# Patient Record
Sex: MALE | Race: WHITE
[De-identification: names, ages, dates, MRNs, and addresses within clinical notes are randomized per-mention and may not be internally consistent; named-entity substitution may affect disease eponyms.]

---

## 2017-11-09 ENCOUNTER — HOSPITAL ENCOUNTER (OUTPATIENT)
Dept: HOSPITAL 59 - SUR | Age: 77
Discharge: HOME | End: 2017-11-09
Attending: ORTHOPAEDIC SURGERY
Payer: MEDICARE

## 2017-11-09 DIAGNOSIS — M11.261: ICD-10-CM

## 2017-11-09 DIAGNOSIS — S83.281A: Primary | ICD-10-CM

## 2017-11-09 DIAGNOSIS — E11.9: ICD-10-CM

## 2017-11-09 DIAGNOSIS — Z79.84: ICD-10-CM

## 2017-11-09 PROCEDURE — 01400 ANES OPN/ARTHRS KNEE JT NOS: CPT

## 2017-11-09 PROCEDURE — 36416 COLLJ CAPILLARY BLOOD SPEC: CPT

## 2017-11-09 PROCEDURE — 29881 ARTHRS KNE SRG MNISECTMY M/L: CPT

## 2017-11-09 PROCEDURE — 82948 REAGENT STRIP/BLOOD GLUCOSE: CPT

## 2017-11-09 PROCEDURE — 29876 ARTHRS KNEE SURG SYNVCT MAJ: CPT

## 2017-11-10 NOTE — OPERATIVE NOTE
DATE OF SURGERY: 11/09/2017 



Surgeon: Aram Marie DO



PREOPERATIVE DIAGNOSES: 

1. Torn medial meniscus of the right knee. 

2. Chondrocalcinosis, right knee. 

3. Osteoarthritis, right knee. 



POSTOPERATIVE DIAGNOSES: 

1. Torn lateral meniscus of the right knee. 

2. Osteoarthritis, right knee. 

3. Synovitis, right knee. 

4. Chondrocalcinosis of the right knee. 



OPERATION: 

1. Arthroscopic partial lateral meniscectomy, right knee. 

2. Arthroscopic partial synovectomy, right knee (2 compartments). 



DESCRIPTION OF PROCEDURE: This 77-year-old male was taken to the operating room, placed in the 
supine position on the operating room table. General anesthesia was induced. The right lower 
extremity was elevated. It was exsanguinated and the tourniquet inflated to 300 mmHg. Arthroscopic 
knee schmidt applied. Right knee prepped with Hibiclens and draped in the usual sterile fashion. 



An inferolateral portion was established for the 4 mm arthroscope. Initial evaluation of the joint 
demonstrated very severe osteoarthritis of the patellofemoral joint with the lateral facet and 
lateral femoral condyle showing full-thickness articular cartilage loss and the remaining articular 
cartilage showing grade 3 changes. Severe chondrocalcinosis was also present. The intensive 
synovitis was present and partial synovectomy was performed. 



The medial compartment was entered and again, more synovitis with chondrocalcinosis was noted. 
Minimal degenerative changes to the articular cartilage were present there. The meniscus did not 
demonstrate any significant tearing. 



The intracondylar notch was seen to be normal. There was chondrocalcinosis there as well. 



The lateral compartment was entered and a flap tear of the anterior horn and then a radial tear of 
the body at approximately the 9:30 position was present. We resected back to the apex of the tear 
anteriorly leaving only about 2-3 mm of rim remaining. Posteriorly it was slightly more, and this 
tear did not enter the popliteal hiatus. Extensive chondrocalcinosis was present there as well. The 
meniscus was then re-probed and found to be stable. The articular cartilage of the weightbearing 
surface of the lateral femoral condyle demonstrated grade 2 changes. The joint was copiously 
irrigated and suctioned. The portals were infiltrated with 0.25% Marcaine with epinephrine. Sutures 
were placed in these wounds, 4-0 nylon suture. The sterile dressings were applied and the patient 
was taken to the recovery room in satisfactory condition. 



GROSS PATHOLOGY: There was extensive chondrocalcinosis with a severe grade 4 lesion at the 
patellofemoral articulation laterally with severe grade 3 changes noted throughout the remainder of 
the patellofemoral joint, grade 2 changes noted in the weightbearing surface of the lateral femoral 
condyle. There was a complex tear of the lateral meniscus as described and intense synovitis in all 
compartments and partial synovectomy performed as well. CC: YELENA JOINER MD, FACP

MTDD

## 2018-01-01 ENCOUNTER — HOSPITAL ENCOUNTER (EMERGENCY)
Dept: HOSPITAL 59 - ER | Age: 78
Discharge: LEFT BEFORE DISCHARGE | End: 2018-01-01
Payer: MEDICARE

## 2018-01-01 DIAGNOSIS — E11.9: ICD-10-CM

## 2018-01-01 DIAGNOSIS — R07.2: Primary | ICD-10-CM

## 2018-01-01 DIAGNOSIS — L03.115: ICD-10-CM

## 2018-01-01 DIAGNOSIS — Z79.84: ICD-10-CM

## 2018-01-01 LAB
ALBUMIN SERPL-MCNC: 3.9 G/DL (ref 4–5)
ALBUMIN/GLOB SERPL: 1.3 {RATIO} (ref 1.1–1.8)
ALP SERPL-CCNC: 72 U/L (ref 40–129)
ALT SERPL-CCNC: 26 U/L (ref ?–41)
ANION GAP SERPL CALC-SCNC: 13 MMOL/L (ref 7–16)
APTT BLD: 26.6 SECONDS (ref 24.5–39.1)
AST SERPL-CCNC: 22 U/L (ref 10–50)
BASOPHILS NFR BLD: 0.4 % (ref 0–6)
BILIRUB SERPL-MCNC: 0.3 MG/DL (ref 0.2–1)
BUN SERPL-MCNC: 14 MG/DL (ref 8–23)
CO2 SERPL-SCNC: 27 MMOL/L (ref 22–29)
CREAT SERPL-MCNC: 1 MG/DL (ref 0.7–1.2)
EOSINOPHIL NFR BLD: 7.2 % (ref 0–6)
ERYTHROCYTE [DISTWIDTH] IN BLOOD BY AUTOMATED COUNT: 19.7 % (ref 11.5–14.5)
EST GLOMERULAR FILTRATION RATE: > 60 ML/MIN
GLOBULIN SER-MCNC: 3 GM/DL (ref 1.4–4.8)
GLUCOSE SERPL-MCNC: 287 MG/DL (ref 74–109)
GRANULOCYTES NFR BLD: 51.3 % (ref 47–80)
HCT VFR BLD CALC: 40.8 % (ref 42–52)
HGB BLD-MCNC: 12.5 GM/DL (ref 14–18)
INR PPP: 1.01
LYMPHOCYTES NFR BLD AUTO: 28.3 % (ref 16–45)
MCH RBC QN AUTO: 26.9 PG (ref 27–33)
MCHC RBC AUTO-ENTMCNC: 30.6 G/DL (ref 32–36)
MCV RBC AUTO: 88.1 FL (ref 81–97)
MONOCYTES NFR BLD: 12.8 % (ref 0–9)
PLATELET # BLD: 273 K/UL (ref 130–400)
PMV BLD AUTO: 9.9 FL (ref 7.4–10.4)
PROT SERPL-MCNC: 6.9 G/DL (ref 6.6–8.7)
PROTHROMBIN TIME: 10.9 SECONDS (ref 9.5–12.1)
RBC # BLD AUTO: 4.63 M/UL (ref 4.4–5.7)
TSH SERPL-ACNC: 4.64 UIU/ML (ref 0.27–4.2)
WBC # BLD AUTO: 7.1 K/UL (ref 4.2–12.2)

## 2018-01-01 PROCEDURE — 93010 ELECTROCARDIOGRAM REPORT: CPT

## 2018-01-01 PROCEDURE — 71020: CPT

## 2018-01-01 PROCEDURE — 99284 EMERGENCY DEPT VISIT MOD MDM: CPT

## 2018-01-01 PROCEDURE — 83880 ASSAY OF NATRIURETIC PEPTIDE: CPT

## 2018-01-01 PROCEDURE — 93005 ELECTROCARDIOGRAM TRACING: CPT

## 2018-01-01 PROCEDURE — 96374 THER/PROPH/DIAG INJ IV PUSH: CPT

## 2018-01-01 PROCEDURE — 85025 COMPLETE CBC W/AUTO DIFF WBC: CPT

## 2018-01-01 PROCEDURE — 84484 ASSAY OF TROPONIN QUANT: CPT

## 2018-01-01 PROCEDURE — 80053 COMPREHEN METABOLIC PANEL: CPT

## 2018-01-01 PROCEDURE — 84443 ASSAY THYROID STIM HORMONE: CPT

## 2018-01-01 PROCEDURE — 85610 PROTHROMBIN TIME: CPT

## 2018-01-01 PROCEDURE — 85730 THROMBOPLASTIN TIME PARTIAL: CPT

## 2018-01-01 RX ADMIN — NITROGLYCERIN PRN MG: 0.4 TABLET SUBLINGUAL at 19:47

## 2018-01-01 RX ADMIN — NITROGLYCERIN PRN MG: 0.4 TABLET SUBLINGUAL at 19:30

## 2018-01-01 RX ADMIN — NITROGLYCERIN PRN MG: 0.4 TABLET SUBLINGUAL at 19:53

## 2018-01-01 NOTE — EMERGENCY DEPARTMENT RECORD
History of Present Illness





- General


Chief Complaint: Chest Pain


Stated Complaint: CHEST AND SHOULDER PAIN


Time Seen by Provider: 18 19:11


Source: Patient


Mode of Arrival: Ambulatory





- History of Present Illness


Initial Comments: 


The patient states that he developed substernal chest pain around 5 p.m. 

tonight while watching FB on television. It radiates to both shoulders but NOT 

to his back. Patient denies nausea, diaphoresis, SOB, ARI, palpitations or 

belching.  He describes it as moderate severity and "maybe gas, 'hard to 

describe.'"  He denies MI, PE, DVT, CVA, htn, chol elevation, or ever smoking.  

He is an oral diabetic.  His father  of an MI at age 75.  


   Dr. Marie did an arthroscopy on his right knee 17 and found 'gout

, arthritis." Since that time he has had swelling in that leg which is not 

resolving. He says it is warmer and may be pinker.





MD Complaint: Chest pain


Onset/Timin


-: Hour(s)


Onset: During rest


Pain Location: Substernal


Pain Radiation: RUE, LUE


Severity: Moderate


Quality: Aching


Consistency: Constant


Improves With: Nothing


Treatments Prior to Arrival: None, Aspirin


Treatment Prior to Arrival Comment:: 81mg this am.





- Related Data


 Previous Rx's











 Medication  Instructions  Recorded


 


Cephalexin [Keflex] 500 mg PO QID #40 cap 18











 Allergies











Allergy/AdvReac Type Severity Reaction Status Date / Time


 


No Known Drug Allergies Allergy   Verified 18 18:52














Travel Screening





- Travel/Exposure Within Last 30 Days


Have you traveled within the last 30 days?: No





- Travel/Exposure Within Last Year


Have you traveled outside the [place] in the last year?: No





- Additonal Travel Details


Have you been exposed to anyone with a communicable illness?: No





- Travel Symptoms


Symptom Screening: None





Review of Systems


Reviewed: No additional complaints except as noted below


Constitutional: Reports: As per HPI.  Denies: Chills, Fever, Malaise, Night 

sweats, Weakness, Weight change


Eyes: Reports: As per HPI.  Denies: Eye discharge, Eye pain, Photophobia, 

Vision change


ENT: Reports: As per HPI.  Denies: Congestion, Dental pain, Ear pain, Epistaxis

, Hearing loss, Throat pain


Respiratory: Reports: As per HPI.  Denies: Cough, Dyspnea, Hemoptysis, Stridor, 

Wheezes


Cardiovascular: Reports: As per HPI.  Denies: Arrhythmia, Chest pain, Dyspnea 

on exertion, Edema, Murmurs, Orthopnea, Palpitations, Paroxysmal nocturnal 

dyspnea, Rheumatic Fever, Syncope


Endocrine: Reports: As per HPI.  Denies: Fatigue, Heat or cold intolerance, 

Polydipsia, Polyuria


Gastrointestinal: Reports: As per HPI.  Denies: Abdominal pain, Constipation, 

Diarrhea, Hematemesis, Hematochezia, Melena, Nausea, Vomiting


Genitourinary: Reports: As per HPI.  Denies: Dysuria, Frequency, Hematuria, 

Incontinence, Retention, Testicular pain, Testicular mass, Urgency


Musculoskeletal: Reports: As per HPI.  Denies: Arthralgia, Back pain, Gout, 

Joint swelling, Myalgia, Neck pain


Skin: Reports: As per HPI.  Denies: Bruising, Change in color, Change in hair/

nails, Lesions, Pruritus, Rash


Neurological: Reports: As per HPI.  Denies: Abnormal gait, Confusion, Headache, 

Numbness, Paresthesias, Seizure, Tingling, Tremors, Vertigo, Weakness


Psychiatric: Reports: As per HPI.  Denies: Anxiety, Auditory hallucinations, 

Depression, Homicidal thoughts, Suicidal thoughts, Visual hallucinations


Hematological/Lymphatic: Reports: As per HPI.  Denies: Anemia, Blood Clots, 

Easy bleeding, Easy bruising, Swollen glands





Past Medical History





- SOCIAL HISTORY


Smoking Status: Never smoker


Alcohol Use: None


Drug Use: None





- RESPIRATORY


Hx Respiratory Disorders: Yes


Hx Bronchitis: Yes





- CARDIOVASCULAR


Hx Cardio Disorders: No





- NEURO


Hx Neuro Disorders: No





- GI


Hx GI Disorders: Yes


Hx Pancreatitis: Yes





- 


Hx Genitourinary Disorders: No





- ENDOCRINE


Hx Endocrine Disorders: Yes


Hx Diabetes: Yes (type II)


Comment:: doesnt check blood sugar





- MUSCULOSKELETAL


Hx Musculoskeletal Disorders: Yes


Hx Arthritis: Yes (Left knee)





- PSYCH


Hx Psych Problems: No





- HEMATOLOGY/ONCOLOGY


Hx Hematology/Oncology Disorders: No


Hx Cancer: Yes (lip)





Family Medical History


Any Significant Family History?: No


Hx Cancer: Mother


Hx Diabetes: Father


Hx Heart Disease: Father





Physical Exam





- General


General Appearance: Alert, Oriented x3, Cooperative, No acute distress





- Head


Head exam: Normal inspection





- Eye


Eye exam: Normal appearance, PERRL


Pupils: Normal accommodation





- ENT


ENT exam: Normal exam, Mucous membranes moist, Normal external ear exam, Normal 

orophraynx, TM's normal bilaterally


Ear exam: Normal external inspection.  negative: External canal tenderness


Nasal Exam: Normal inspection.  negative: Discharge, Sinus tenderness


Mouth exam: Normal external inspection, Tongue normal


Teeth exam: Normal inspection.  negative: Dental caries


Throat exam: Normal inspection.  negative: Tonsillar erythema, Tonsillar exudate





- Neck


Neck exam: Normal inspection, Full ROM.  negative: Tenderness





- Respiratory


Respiratory exam: Normal lung sounds bilaterally.  negative: Respiratory 

distress





- Cardiovascular


Cardiovascular Exam: Regular rate, Normal rhythm, Normal heart sounds





- GI/Abdominal


GI/Abdominal exam: Soft, Normal bowel sounds.  negative: Tenderness





- Rectal


Rectal exam: Deferred





- 


 exam: Deferred





- Extremities


Extremities exam: Normal inspection, Full ROM, Normal capillary refill, Pedal 

edema (bilateral, right greater than left leg.  Right legs is mildly 

erythematous and warmer than the othe leg to below the knee. Skin is 

circumferentially tight from the swelling with no distinct calf tenderness.).  

negative: Tenderness





- Back


Back exam: Reports: Normal inspection, Full ROM.  Denies: Muscle spasm, Rash 

noted, Tenderness





- Neurological


Neurological exam: Alert, Normal gait, Oriented X3, Reflexes normal





- Psychiatric


Psychiatric exam: Normal affect, Normal mood





- Skin


Skin exam: Dry, Intact, Normal color, Warm





Course





 Vital Signs











  18





  18:47


 


Temperature 97.4 F L


 


Pulse Rate 72


 


Respiratory 20





Rate 


 


Blood Pressure 161/82


 


Pulse Ox 96














- Reevaluation(s)


Reevaluation #1: 


Patient stats the nitros helped his chest, but he is uncertain if it is 

completely gone. He states his leg hurts more than his chest ever did. 


18 20:27





Reevaluation #2: 


It ws recommended for patient to be admitted for chest pain and for right lower 

leg cellulitis. Patient is refusing amissin and wishes discharge home. He 

states he will recheck with his PCP this week. but refuses to stay here 

tonight. He also is refusing to stay for repeat troponin.  Son at bedside is 

unable to change his father's mind. 


18 21:21


Patient is adamant about leaving AMA. He agrees to come back if he changes his 

mind and to see Dr. Meyers in office this week without fail.





18 21:24





18 21:33








Medical Decision Making





- Management Options


MDM Management: Additional Work-up Planned (e.g. ADM/Transfer/OP Study) (

Refusing admission LEAVING AMA)





- Data Complexity


MDM Data: Labs Ordered and/or Reviewed, X-Ray Ordered and/or Reviewed (CXR 

Negative per rad.), EKG Ordered and/or Reviewed





- Lab Data


Result diagrams: 


 18 18:58





 18 18:58





- EKG Data


-: EKG Interpreted by Me


EKG: LBBB (old and unchanged from 17.)





Disposition


Clinical Impression: 


 Cellulitis of leg, right





Chest pain


Qualifiers:


 Chest pain type: unspecified Qualified Code(s): R07.9 - Chest pain, unspecified





Disposition: Against Medical Advice


Condition: (1) Good


Instructions:  Chest Pain (ED), Cellulitis (ED)


Additional Instructions: 


Call Dr. Meyers office in a.m to be seen without fail.


Return if you change your mind.


Keflex 500 mg four times daily as directed.








Prescriptions: 


Cephalexin [Keflex] 500 mg PO QID #40 cap


Forms:  Patient Portal Access





Quality





- Quality Measures


Quality Measures: N/A





- Blood Pressure Screening


Does Patient Have Any of the Following: No


Blood Pressure Classification: Pre-Hypertensive BP Reading


Systolic Measurement: 161


Diastolic Measurement: 82


Screening for High Blood Pressure: < Pre-Hypertensive BP, F/U Documented > [

]


Pre-Hypertensive Follow-up Interventions: Follow-up with rescreen every year.

## 2018-01-02 NOTE — RADIOLOGY REPORT
EXAM:  CHEST, TWO VIEWS



HISTORY:  CHEST PAIN RADIATING TO BOTH SHOULDERS.  



TECHNIQUE:  Two views of the chest were obtained.  



Comparison:  5/16/15.  



FINDINGS:  The heart is not enlarged.  The lungs and pleural spaces are clear.  
Mild to moderate degenerative changes of the thoracic spine.  



IMPRESSION:  

NO ACUTE CARDIOPULMONARY ABNORMALITY.  



JOB NUMBER:  [id number]
MTDD

## 2018-01-29 ENCOUNTER — HOSPITAL ENCOUNTER (OUTPATIENT)
Dept: HOSPITAL 59 - HOP | Age: 78
Discharge: HOME | End: 2018-01-29
Attending: INTERNAL MEDICINE
Payer: MEDICARE

## 2018-01-29 DIAGNOSIS — K64.8: ICD-10-CM

## 2018-01-29 DIAGNOSIS — C18.2: ICD-10-CM

## 2018-01-29 DIAGNOSIS — K57.30: ICD-10-CM

## 2018-01-29 DIAGNOSIS — E11.9: ICD-10-CM

## 2018-01-29 DIAGNOSIS — D50.9: Primary | ICD-10-CM

## 2018-01-29 DIAGNOSIS — Z79.4: ICD-10-CM

## 2018-11-10 ENCOUNTER — HOSPITAL ENCOUNTER (EMERGENCY)
Dept: HOSPITAL 59 - ER | Age: 78
Discharge: HOME | End: 2018-11-10
Payer: MEDICARE

## 2018-11-10 DIAGNOSIS — S30.0XXA: Primary | ICD-10-CM
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
## 2018-11-10 DIAGNOSIS — E11.9: ICD-10-CM

## 2018-11-10 DIAGNOSIS — Z79.84: ICD-10-CM
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
## 2018-11-10 PROCEDURE — 99283 EMERGENCY DEPT VISIT LOW MDM: CPT

## 2018-11-10 PROCEDURE — 72110 X-RAY EXAM L-2 SPINE 4/>VWS: CPT

## 2018-11-10 NOTE — EMERGENCY DEPARTMENT RECORD
History of Present Illness





- General


Chief Complaint: Fall Injury


Stated Complaint: FALL/LOWER BACK INJURY


Time Seen by Provider: 11/10/18 14:31


Source: Patient, RN notes reviewed


Mode of Arrival: Ambulatory





- History of Present Illness


Initial Comments: 


fall on the ice using a bull dozer complaining of lumbar pback pain L5 





MD Complaint: Fall


Onset/Timin


-: Days(s)


Fall From: Standing


Fall Witnessed: Yes, by family


Place Fall Occurred: Work


Loss of Consciousness: None


Prolonged Down Time?: No


Symptoms Prior to Fall: None





- Brennan Coma Scale


Eye Response: (4) Open spontaneously


Motor Response: (6) Obeys commands


Verbal Response: (5) Oriented


Ragland Total: 15





- Related Data


 Previous Rx's











 Medication  Instructions  Recorded


 


Hydrocodone/Acetaminophen [Norco 1 each PO Q4HR #18 tablet 11/10/18





5-325 Tablet]  











 Allergies











Allergy/AdvReac Type Severity Reaction Status Date / Time


 


No Known Drug Allergies Allergy   Verified 11/10/18 14:32














Travel Screening





- Travel/Exposure Within Last 30 Days


Have you traveled within the last 30 days?: No





- Travel/Exposure Within Last Year


Have you traveled outside the [place] in the last year?: No





- Additonal Travel Details


Have you been exposed to anyone with a communicable illness?: No





- Travel Symptoms


Symptom Screening: None





Review of Systems


Reviewed: No additional complaints except as noted below


Constitutional: Reports: As per HPI.  Denies: Chills, Fever, Malaise, Night 

sweats, Weakness, Weight change


Eyes: Reports: As per HPI.  Denies: Eye discharge, Eye pain, Photophobia, 

Vision change


ENT: Reports: As per HPI.  Denies: Congestion, Dental pain, Ear pain, Epistaxis

, Hearing loss, Throat pain


Respiratory: Reports: As per HPI.  Denies: Cough, Dyspnea, Hemoptysis, Stridor, 

Wheezes


Cardiovascular: Reports: As per HPI.  Denies: Arrhythmia, Chest pain, Dyspnea 

on exertion, Edema, Murmurs, Orthopnea, Palpitations, Paroxysmal nocturnal 

dyspnea, Rheumatic Fever, Syncope


Endocrine: Reports: As per HPI.  Denies: Fatigue, Heat or cold intolerance, 

Polydipsia, Polyuria


Gastrointestinal: Reports: As per HPI.  Denies: Abdominal pain, Constipation, 

Diarrhea, Hematemesis, Hematochezia, Melena, Nausea, Vomiting


Genitourinary: Reports: As per HPI.  Denies: Dysuria, Frequency, Hematuria, 

Incontinence, Retention, Testicular pain, Testicular mass, Urgency


Musculoskeletal: Reports: As per HPI, Back pain.  Denies: Arthralgia, Gout, 

Joint swelling, Myalgia, Neck pain


Skin: Reports: As per HPI.  Denies: Bruising, Change in color, Change in hair/

nails, Lesions, Pruritus, Rash


Neurological: Reports: As per HPI.  Denies: Abnormal gait, Confusion, Headache, 

Numbness, Paresthesias, Seizure, Tingling, Tremors, Vertigo, Weakness


Psychiatric: Reports: As per HPI.  Denies: Anxiety, Auditory hallucinations, 

Depression, Homicidal thoughts, Suicidal thoughts, Visual hallucinations


Hematological/Lymphatic: Reports: As per HPI.  Denies: Anemia, Blood Clots, 

Easy bleeding, Easy bruising, Swollen glands





Past Medical History





- SOCIAL HISTORY


Smoking Status: Never smoker


Alcohol Use: None


Drug Use: None





- RESPIRATORY


Hx Respiratory Disorders: Yes


Hx Bronchitis: Yes





- CARDIOVASCULAR


Hx Cardio Disorders: No





- NEURO


Hx Neuro Disorders: No





- GI


Hx GI Disorders: Yes


Hx Pancreatitis: Yes





- 


Hx Genitourinary Disorders: No





- ENDOCRINE


Hx Endocrine Disorders: Yes


Hx Diabetes: Yes (type II)


Comment:: doesnt check blood sugar





- MUSCULOSKELETAL


Hx Musculoskeletal Disorders: Yes


Hx Arthritis: Yes (Left knee)





- PSYCH


Hx Psych Problems: No





- HEMATOLOGY/ONCOLOGY


Hx Hematology/Oncology Disorders: No


Hx Cancer: Yes (lip)





Family Medical History


Any Significant Family History?: Yes


Hx Cancer: Mother


Hx Diabetes: Father


Hx Heart Disease: Father





Physical Exam





- General


General Appearance: Alert, Oriented x3, Cooperative, No acute distress





- Head


Head exam: Normal inspection





- Eye


Eye exam: Normal appearance, PERRL


Pupils: Normal accommodation





- ENT


ENT exam: Normal exam, Mucous membranes moist, Normal external ear exam, Normal 

orophraynx, TM's normal bilaterally


Ear exam: Normal external inspection.  negative: External canal tenderness


Nasal Exam: Normal inspection.  negative: Discharge, Sinus tenderness


Mouth exam: Normal external inspection, Tongue normal


Teeth exam: Normal inspection.  negative: Dental caries


Throat exam: Normal inspection.  negative: Tonsillar erythema, Tonsillar exudate





- Neck


Neck exam: Normal inspection, Full ROM.  negative: Tenderness





- Respiratory


Respiratory exam: Normal lung sounds bilaterally.  negative: Respiratory 

distress





- Cardiovascular


Cardiovascular Exam: Regular rate, Normal rhythm, Normal heart sounds





- GI/Abdominal


GI/Abdominal exam: Soft, Normal bowel sounds.  negative: Tenderness





- Rectal


Rectal exam: Deferred





- 


 exam: Deferred





- Extremities


Extremities exam: Normal inspection, Full ROM, Normal capillary refill.  

negative: Tenderness





- Back


Back exam: Reports: Normal inspection, Full ROM.  Denies: Muscle spasm, Rash 

noted, Tenderness





- Neurological


Neurological exam: Alert, Normal gait, Oriented X3, Reflexes normal





- Psychiatric


Psychiatric exam: Normal affect, Normal mood





- Skin


Skin exam: Dry, Intact, Normal color, Warm





Course





 Vital Signs











  11/10/18





  14:23


 


Temperature 98.3 F


 


Pulse Rate 94 H


 


Respiratory 18





Rate 


 


Blood Pressure 150/94


 


Pulse Ox 97














Medical Decision Making





- Data Complexity


MDM Data: X-Ray Ordered and/or Reviewed (no evidence of lumbar compression 

fracture)





Disposition


Clinical Impression: 


Lumbar contusion


Qualifiers:


 Encounter type: initial encounter Qualified Code(s): S30.0XXA - Contusion of 

lower back and pelvis, initial encounter





Disposition: Home, Self-Care


Condition: (1) Good


Instructions:  Contusion in Adults (ED)


Additional Instructions: 


follow up with Dr Meyers or Jeanne in 4-7 days


for mild to moderate pain use tylenol or ibuprofin


Prescriptions: 


Hydrocodone/Acetaminophen [Norco 5-325 Tablet] 1 each PO Q4HR #18 tablet


Forms:  Patient Portal Access


Time of Disposition: 15:37





Quality





- Quality Measures


Quality Measures: N/A





- Blood Pressure Screening


Does Patient Have Any of the Following: No


Blood Pressure Classification: Hypertensive Reading


Systolic Measurement: 150


Diastolic Measurement: 94


Screening for High Blood Pressure: < First Hypertensive BP, F/U Documented > [

]


First Hypertensive Follow-up Interventions: Referral to alternative/primary 

care provider.

## 2018-11-12 NOTE — RADIOLOGY REPORT
EXAM:  LUMBAR SPINE, FIVE VIEWS 



HISTORY:  FALL YESTERDAY LANDING ON TAILBONE, TAILBONE PAIN.  



TECHNIQUE:  AP, lateral, bilateral obliques, and cone down view of the lumbar 
spine were obtained.  



Comparison:  CT of the abdomen and pelvis 2/05/18. 



Encounter:  Initial. 



FINDINGS:  Five lumbar vertebral bodies.  No visible spondylolysis.  Alignment 
and vertebral body heights are maintained.  Moderate to severe disk space 
narrowing at L1-L2, L2-L3, L4-L5, and L5-S1 with multilevel end plate spurring 
and large syndesmophytes.  Facet arthropathy is greatest in the lower lumbar 
spine.  



IMPRESSION:  

1.  NO EVIDENCE FOR LUMBAR SPINE COMPRESSION FRACTURE.  



2.  MULTILEVEL DEGENERATIVE CHANGES.  



JOB NUMBER:  [id number]
[facility]

## 2019-01-17 ENCOUNTER — HOSPITAL ENCOUNTER (OUTPATIENT)
Dept: HOSPITAL 59 - SUR | Age: 79
LOS: 1 days | Discharge: HOME | End: 2019-01-18
Attending: ORTHOPAEDIC SURGERY
Payer: MEDICARE

## 2019-01-17 DIAGNOSIS — E11.9: ICD-10-CM

## 2019-01-17 DIAGNOSIS — M17.12: Primary | ICD-10-CM

## 2019-01-17 PROCEDURE — 76942 ECHO GUIDE FOR BIOPSY: CPT

## 2019-01-17 PROCEDURE — 64447 NJX AA&/STRD FEMORAL NRV IMG: CPT

## 2019-01-17 PROCEDURE — 27447 TOTAL KNEE ARTHROPLASTY: CPT

## 2019-01-17 PROCEDURE — 01402 ANES OPN/ARTH TOT KNE ARTHRP: CPT

## 2019-01-17 PROCEDURE — 97530 THERAPEUTIC ACTIVITIES: CPT

## 2019-01-17 PROCEDURE — 85025 COMPLETE CBC W/AUTO DIFF WBC: CPT

## 2019-01-17 RX ADMIN — Medication SCH MG: at 22:26

## 2019-01-17 RX ADMIN — OXYCODONE HYDROCHLORIDE AND ACETAMINOPHEN PRN UDTAB: 5; 325 TABLET ORAL at 14:41

## 2019-01-17 RX ADMIN — METFORMIN HYDROCHLORIDE SCH MG: 500 TABLET ORAL at 22:26

## 2019-01-17 RX ADMIN — SODIUM CHLORIDE, SODIUM LACTATE, POTASSIUM CHLORIDE, AND CALCIUM CHLORIDE SCH MLS/HR: .6; .31; .03; .02 INJECTION, SOLUTION INTRAVENOUS at 09:45

## 2019-01-17 RX ADMIN — CEFAZOLIN SODIUM SCH MLS/HR: 2 SOLUTION INTRAVENOUS at 23:33

## 2019-01-17 RX ADMIN — GLIPIZIDE SCH MG: 5 TABLET ORAL at 22:26

## 2019-01-17 RX ADMIN — OXYCODONE HYDROCHLORIDE AND ACETAMINOPHEN PRN UDTAB: 5; 325 TABLET ORAL at 22:25

## 2019-01-17 RX ADMIN — PANTOPRAZOLE SODIUM SCH MG: 40 TABLET, DELAYED RELEASE ORAL at 13:41

## 2019-01-17 RX ADMIN — SODIUM CHLORIDE, SODIUM LACTATE, POTASSIUM CHLORIDE, AND CALCIUM CHLORIDE SCH MLS/HR: .6; .31; .03; .02 INJECTION, SOLUTION INTRAVENOUS at 15:42

## 2019-01-17 RX ADMIN — CEFAZOLIN SODIUM SCH MLS/HR: 2 SOLUTION INTRAVENOUS at 15:41

## 2019-01-17 NOTE — REHAB EVALUATION
Patient Information





- Patient Information


Diagnosis: L knee OA


Ordered Treatment: PT Evaluate and Treat


Status: Initial Evaluation


Surgery: Yes (L TKA)


Date of Surgery: 01/17/19


Past Medical/Surgical Hx: 


 PAST MEDICAL/SURGICAL HISTORY





Past Surgical History            bilateral shoulder sx


                                 lumbar fusion


                                 issac





PMH - Respiratory





Hx Respiratory Disorders         Yes


Hx Bronchitis                    Yes





PMH - Cardiovascular





Hx Cardiovascular Disorders      No


Exercise Tolerance               Good





PMH - Neuro





Hx Neurological Disorders        No





PMH - GI





Hx Gastrointestinal Disorders    Yes


Hx Pancreatitis                  Yes





PMH - 





Hx Genitourinary Disorders       No





PMH - Endocrine





Hx Endocrine Disorders           Yes


Hx Diabetes                      Yes: type II


Hx of NIDDM                      Yes


Comment:                         doesnt check blood sugar





PMH - Musculoskeletal





Hx Musculoskeletal Disorders     Yes


Hx Arthritis                     Yes: Left knee


Comment:                         lumbar lami hx





PMH - Psych





Hx Psychiatric Problems          No





PMH - Hematology/Oncology





Hx Hematology/Oncology           No


Disorders                        


Hx Cancer                        Yes: lip








Premorbid Status: Detail (The patient was independent with all mobility prior 

to surgery.)


Social History: Detail (The patient lives alone in a one story house with 2 

small steps at the enterance with 2 railings. The bathroom is equipped with a 

walk in shower, grab bars and a hand held shower and an elevated toilet seat 

with grab bars. The patient reports he has a standard walker and a 4 wheeled 

walker "somewhere" and a standard cane. ( Patient was instructed to let PT 

staff know if he needed a walker ASAP.))


Precautions: Universal, Fall, Other (WBAT on the L LE)





- Time With Patient


Total Time Spent With Patient (Min): 30


Treatment Procedures: Detail (Initial Evaluation, gait training)





Subjective Information





- Subjective Information


Per Patient (The patient had minimal complaints of L knee pain and did not rate 

his pain using 0-10 pain scale.)





Objective Data





- Mental Status


Patient Orientation: Oriented x3





- Visual Perception


Appears within normal limits for therapeutic activities





- ROM


Not within normal limits (The patient's L knee AROM is limited s/p surgery . 

All other AROM was WFL.)





- Strength/Tone


Not within normal limits (The patient's L LE strength was not tested secondary 

to s/p surgery however patient required  minimal assist to lift L LE into bed. 

The patient's R LE strength was generally 4+ to 5/5.)





- Bed Mobility


Needs Assist (The patient was independent with scooting up in bed. The patient 

required minimal PA to lift L LE with supine to and from sit, the patient was 

independent with upper body.)





- Transfers


Independent (The patient was independent with sit to and from stand transfer.)





- Balance


Balance Sitting: Good


Balance Standing: Good





- Sensation


Intact





- Gait


Detail (The patient ambulated with standard walker a distance of 37 feet x 1 

WBAT on the L LE with supervision for safety only of 1. The patient returned to 

bed with call light within reach, ice pack and pressure garments in place.)





Therapy Assessment





- Therapy Assessment


Detail (The patient was independent with sit to stand transfer, required 

minimal PA to lift L LE for supine to sit and supervision only for ambulation. 

Feel the patient will progress well with mobility.)





Problem List





- Problem List


Physical Therapy Problem List: Detail (1) Decreased L knee AROM as to be 

expected following surgery 2) Decreased L LE strength as to be expected 

following surgery 3) Impaired gait as to be expected following surgery)





Goals





- Goals


Physical Therapy Goals: 1) The patient will ambulate independently with 

appropriate assistive device WBAT on the L LE a distance 75 feet plus.  2) The 

patient will ambulate on stairs using proper technique with supervision for 

safety.  3) The patient will be independent with bed mobility.  4) The patient 

will be independent with TKA HEP.





Prognosis





- Prognosis


Good





Plan





- Plan


Physical Therapy Plan: PT 1-2 visits for gait training on levels and stairs, 

bed mobility and instruction in TKA HEP.

## 2019-01-18 LAB
ERYTHROCYTE [DISTWIDTH] IN BLOOD BY AUTOMATED COUNT: 15 % (ref 11.5–14.5)
HCT VFR BLD CALC: 29.8 % (ref 42–52)
HGB BLD-MCNC: 9 GM/DL (ref 14–18)
MCH RBC QN AUTO: 26.4 PG (ref 27–33)
MCHC RBC AUTO-ENTMCNC: 30.2 G/DL (ref 32–36)
MCV RBC AUTO: 87.6 FL (ref 81–97)
PLATELET # BLD: 213 K/UL (ref 130–400)
PMV BLD AUTO: 9.3 FL (ref 7.4–10.4)
RBC # BLD AUTO: 3.4 M/UL (ref 4.4–5.7)
WBC # BLD AUTO: 7.2 K/UL (ref 4.2–12.2)

## 2019-01-18 RX ADMIN — Medication SCH MG: at 09:33

## 2019-01-18 RX ADMIN — GLIPIZIDE SCH MG: 5 TABLET ORAL at 09:33

## 2019-01-18 RX ADMIN — PANTOPRAZOLE SODIUM SCH MG: 40 TABLET, DELAYED RELEASE ORAL at 07:56

## 2019-01-18 RX ADMIN — CEFAZOLIN SODIUM SCH MLS/HR: 2 SOLUTION INTRAVENOUS at 07:57

## 2019-01-18 RX ADMIN — OXYCODONE HYDROCHLORIDE AND ACETAMINOPHEN PRN UDTAB: 5; 325 TABLET ORAL at 05:00

## 2019-01-18 RX ADMIN — METFORMIN HYDROCHLORIDE SCH MG: 500 TABLET ORAL at 09:16

## 2019-01-18 RX ADMIN — SODIUM CHLORIDE, SODIUM LACTATE, POTASSIUM CHLORIDE, AND CALCIUM CHLORIDE SCH: .6; .31; .03; .02 INJECTION, SOLUTION INTRAVENOUS at 08:41

## 2019-01-18 NOTE — REHAB EVALUATION
Patient Information





- Patient Information


Diagnosis: L knee OA


Ordered Treatment: OT Evaluate and Treat


Status: Initial Evaluation


Surgery: Yes (L TKA)


Date of Surgery: 01/17/19


Past Medical/Surgical Hx: 


 PAST MEDICAL/SURGICAL HISTORY





Past Surgical History            bilateral shoulder sx


                                 lumbar fusion


                                 issac





PMH - Respiratory





Hx Respiratory Disorders         Yes


Hx Bronchitis                    Yes





PMH - Cardiovascular





Hx Cardiovascular Disorders      No


Exercise Tolerance               Good





PMH - Neuro





Hx Neurological Disorders        No





PMH - GI





Hx Gastrointestinal Disorders    Yes


Hx Pancreatitis                  Yes





PMH - 





Hx Genitourinary Disorders       No





PMH - Endocrine





Hx Endocrine Disorders           Yes


Hx Diabetes                      Yes: type II


Hx of NIDDM                      Yes


Comment:                         doesnt check blood sugar





PMH - Musculoskeletal





Hx Musculoskeletal Disorders     Yes


Hx Arthritis                     Yes: Left knee


Comment:                         lumbar lami hx





PMH - Psych





Hx Psychiatric Problems          No





PMH - Hematology/Oncology





Hx Hematology/Oncology           No


Disorders                        


Hx Cancer                        Yes: lip








Premorbid Status: Detail (The patient was independent with all mobility, home 

mgmt, meal prep and laundry prior to surgery.)


Social History: Detail (The patient lives alone in a one story house with 2 

small steps at the entrance with no railings. The bathroom is equipped with a 

walk in shower, grab bars and a hand held shower and an elevated toilet seat 

with grab bars. The patient reports he has a standard walker and a 4 wheeled 

walker "somewhere" and a standard cane.)


Precautions: Universal, Fall, Other (WBAT on the L LE)





- Time With Patient


Total Time Spent With Patient (Min): 55


Treatment Procedures: Detail (OT eval low complexity)





Subjective Information





- Subjective Information


Per Patient





Objective Data





- Pain


Pain Present: Yes (3/10)





- Mental Status


Patient Orientation: Oriented x3





- Visual Perception


Appears within normal limits for therapeutic activities





- ROM


Within normal limits (Nishant UE AROM WNL)





- Strength/Tone


Within normal limits (Nishant UE strength WNL)





- Coordination


Appears within normal limits for therapeutic activities





- Bed Mobility


Needs Assist (Pt required mod assist x 1 for supine to sit.)





- Transfers


Needs Assist (Min assist for sit to stand from EOB.  He required bed raised to 

be able to stand from sitting.)





- Balance


Balance Sitting: Good


Balance Standing: Fair





- Sensation


Intact





- Gait


Detail (Pt ambulating in room with standard walker and CG assist.)





- ADL's/IADL's


Detail (Pt educated on modified LE dressing techniques.  He was Ind with 

doffing briefs, donning underwear and sweat pants as well as slip on slippers.  

He required assist to doff left slipper sock.  Pt reports he has a reacher and 

sock "spoon" that he can use for modified LE dressing.  Reviewed kitchen and 

shower safety, pt verbalized understanding.)





Therapy Assessment





- Therapy Assessment


Detail (Pt requires assist for supine to sit and partial LE dressing, he 

reports family will be available.)





Problem List





- Problem List


Physical Therapy Problem List: Detail (1) Decreased L knee AROM as to be 

expected following surgery 2) Decreased L LE strength as to be expected 

following surgery 3) Impaired gait as to be expected following surgery)


Occupational Therapy Problem List: Detail (No IP OT problems identified.)





Goals





- Goals


Physical Therapy Goals: 1) The patient will ambulate independently with 

appropriate assistive device WBAT on the L LE a distance 75 feet plus.  2) The 

patient will ambulate on stairs using proper technique with supervision for 

safety.  3) The patient will be independent with bed mobility.  4) The patient 

will be independent with TKA HEP.


Occupational Therapy Goals: No current IP OT goals identified.





Prognosis





- Prognosis


Good





Plan





- Plan


Physical Therapy Plan: PT 1-2 visits for gait training on levels and stairs, 

bed mobility and instruction in TKA HEP.


Occupational Therapy Plan: No further IP OT recommended.  Thank you for this 

referral.

## 2019-01-18 NOTE — OPERATIVE NOTE
DATE OF SURGERY:  01/17/2019



Surgeon:  Aram Marie DO



Referring physician:   Navjot Meyers MD, FACP 



PREOPERATIVE DIAGNOSIS:  Primary osteoarthritis of the left knee. 



POSTOPERATIVE DIAGNOSIS:  Primary osteoarthritis of the left knee.



OPERATION:  Left total knee arthroplasty. 



Anesthesia:  Spinal. 



PROCEDURE:  This 78-year-old male was taken to the operating room and placed in the supine position 
on the operating room table, spinal anesthesia was induced.  The left lower extremity was elevated, 
prepped with Hibiclens and draped in the usual sterile fashion.  It was exsanguinated and the 
tourniquet inflated to 300 mmHg.   All scrubbed personnel wore personal isolation suits.   



An anterior longitudinal midline incision was made, followed by a medial parapatellar arthrotomy 
incision.  An intracondylar drill hole was made for the intramedullary alignment costa.  The cutting 
block for the distal femur was affixed at a 6 degree valgus 10 mm cut.  The wafer of bone was then 
removed and the sizing jig was affixed and a size 72.5 was seen to be the appropriate size.  A 
4-in-1 cutting block was pinned in 3 degrees of external rotation and appropriate cuts were made.   



We then directed our attention to the proximal tibia.  An extramedullary alignment guide was used to 
cut the proximal tibia, referencing a 10 mm cut off the lateral tibial plateau.  After the cutting 
block was appropriately positioned, a 3 degree posterior slope cut was made and the wafer of bone 
was removed.  We then removed remnants of the menisci and osteophytes from the posterior aspect of 
the joint.  The tibia was sized to a size 83, a stem punch was used.   The wound copiously irrigated 
with pulse lavage lactated Ringer's solution. 



The trial components inserted were a 72.5 cruciate-retaining femur and a 10 mm bearing, gave us full 
extension and full flexion and excellent stability throughout the range of motion.  The patella was 
cut and restored to anatomic height with a 37 x 8.6 mm patella.  Then, all trial components were 
removed and the wound again copiously irrigated with pulse lavage lactated Ringer's solution.  All 
bony surfaces were dried.  All components were cemented and excess cement removed after the 
insertion of each component.  Initially, the size 83 tibial baseplate was inserted, followed by the 
10 mm bearing, followed by the femur, and subsequently the patella.  Once the cement had hardened, 
the knee was again taken through range of motion and all components stable and the knee was seen to 
be satisfactory with full extension and flexion easily to 130 to 135 degrees.  



The wound again copiously irrigated with lactated Ringer's solution and a drain placed in the knee 
and the arthrotomy incision closed with a 2 Vicryl in the capsule and the subcutaneous tissue closed 
with 0 Vicryl.  The skin was stapled and sterile dressings were applied with a Polar Care and the 
patient taken to the recovery room in satisfactory condition. 



GROSS PATHOLOGY:  This patient demonstrated severe osteoarthritis, tri-compartmental full-thickness 
articular cartilage loss noted in all areas.  



Final components inserted were a [employer] Vanguard size 72.5 cruciate retaining femur, an 83 
tibial baseplate, a 10 mm anterior stabilized bearing, and a 37 x 8.6 mm patella was used.  
ALEN

## 2024-02-28 NOTE — OPERATIVE NOTE
DATE OF SURGERY: 01/29/2018



OPERATION: COLONOSCOPY to the ascending colon with multiple biopsies and ink spot tattoo. 



INDICATION: Iron deficiency anemia. 



HISTORY: The patient is a pleasant 77-year-old gentleman seen today for colonoscopy for the above 
history of iron deficiency anemia. This is his first colonoscopy. He denies bleeding or abdominal 
pain. His hemoglobin was in the 10 range and with iron supplements went up to 12. 



ANESTHESIA: Intravenous sedation was administered by the department of anesthesiology and included 
Diprivan titrated to effect. 



PROCEDURE: Following informed consent from this alert individual including a discussion of the risks 
and benefits of the procedure and an opportunity for the patient to ask questions, the patient was 
in the left lateral decubitus position. A digital rectal examination was performed. No abnormalities 
were noted. Following this, the Olympus  video colonoscope was inserted into the rectum without 
resistance. The rectal mucosa as visualized had a normal appearance with normal folds and 
distensibility. The sigmoid colon had significant diverticulosis with redundancy. There was also a 
fair amount of retained liquid and solid stool noted. The colonoscope could be advanced through the 
descending colon into the transverse colon and ascending colon with some difficulty due to the poor 
preparation. Multiple attempts at cannulating the cecal base were unsuccessful despite abdominal 
pressure support. In the ascending colon, there was a large 5 cm mass noted. Multiple biopsies of 
the lesion were obtained. This site was then injected with ink spot, as its exact location is not 
perfectly clear due to inability to evaluate the cecal base. I believe it is most likely in the 
ascending colon. From this point, the colonoscope was then withdrawn. Again extensive diverticulosis 
was noted in the left colon, predominantly in the sigmoid region. There was retained stool noted 
throughout with washing and suctioning employed vigorously but still with fair visualization at 
best. The endoscope was then drawn back into the rectum where retroflexion accomplished following 
air insufflation revealed small internal hemorrhoids. The endoscope was then removed. The patient 
tolerated the procedure well and was returned to the recovery area in stable condition. 



IMPRESSION: 

1. A 5 cm ascending colon mass, biopsies taken and ink spot tattooing applied to the site. 

2. Inability to reach the cecal base due to marked redundancy of the left colon and poor 
preparation. 

3. Extensive sigmoid diverticulosis. 

4. Small internal hemorrhoids. 



RECOMMENDATIONS: At this point, further recommendations will be forthcoming pending results of 
biopsy but I would make a referral to Dr. Esteban Chery for anticipated colon resection. Further 
recommendations pending. Followup will be with Dr. Meyers as well. 



As always, thank you for allowing me to participate in the care of your patient.  CC: YELENA MEYERS MD, FACP

Dr. Miri LOWRY [phone]